# Patient Record
Sex: FEMALE | Race: WHITE
[De-identification: names, ages, dates, MRNs, and addresses within clinical notes are randomized per-mention and may not be internally consistent; named-entity substitution may affect disease eponyms.]

---

## 2017-01-26 ENCOUNTER — HOSPITAL ENCOUNTER (OUTPATIENT)
Dept: HOSPITAL 80 - FCP | Age: 77
End: 2017-01-26
Attending: NURSE PRACTITIONER
Payer: COMMERCIAL

## 2017-01-26 DIAGNOSIS — Z51.81: Primary | ICD-10-CM

## 2017-01-26 NOTE — CPEKG
Heart Rate: 68

RR Interval: 882

P-R Interval: 148

QRSD Interval: 78

QT Interval: 384

QTC Interval: 409

P Axis: 38

QRS Axis: 19

T Wave Axis: -9

EKG Severity - NORMAL ECG -

EKG Impression: SINUS RHYTHM

Electronically Signed By: Aubrey Echeverria 26-Jan-2017 19:24:38

## 2017-03-15 ENCOUNTER — HOSPITAL ENCOUNTER (OUTPATIENT)
Dept: HOSPITAL 80 - FIMAGING | Age: 77
End: 2017-03-15
Attending: INTERNAL MEDICINE
Payer: COMMERCIAL

## 2017-03-15 DIAGNOSIS — M24.112: ICD-10-CM

## 2017-03-15 DIAGNOSIS — M19.012: Primary | ICD-10-CM

## 2018-01-31 NOTE — GHP
[f 
rep st]



                                                       PREOP HISTORY AND 
PHYSICAL





ADMISSION DIAGNOSIS:  Vaginal vault prolapse by history.



HISTORY OF PRESENT ILLNESS:  This is a 77-year-old lady who has had vaginal 
prolapse and has dealt with it for years.  At the present time, she is to the 
point she does not want to deal with it anymore.  She has decided to undergo a 
sacral spinous suspension, possible colpopexy.  We discussed that if we do the 
sacral spinous suspension, it will be done with a porcine graft.  At the same 
time, may consider doing a transobturator tape, but the sacral colpopexy is the 
other option of therapy.



PAST MEDICAL HISTORY:  She has had cystocele, diabetes, hypertension, vaginal 
vault prolapse.



PAST SURGICAL HISTORY:  Spinal surgery, leg surgery, gallbladder.



MEDICATIONS:  Include gabapentin, __________, metformin, methadone, omeprazole, 
triamterene.



ALLERGIES:  None.



SOCIAL HISTORY:  Mild alcohol consumption.  Former smoker.  Immunizations up to 
date.



REVIEW OF SYSTEMS:  Negative cardiac, respiratory, GI, and endocrine.



PHYSICAL EXAMINATION:  VITAL SIGNS:  Stable.  CHEST:  Clear.  HEART:  Regular 
rate and rhythm.  ABDOMEN:  Normal.  No organomegaly, rebound, or guarding.



ASSESSMENT/PLAN:  She has significant cystocele.  At the present time, she is 
admitted for the above procedure.  Indications, complications, and risks 
associated with it discussed.  She appears to be well informed.  Written and 
verbal consent have been obtained.





Job #:  [id number]

MTDD

## 2018-02-01 ENCOUNTER — HOSPITAL ENCOUNTER (OUTPATIENT)
Dept: HOSPITAL 80 - FSGY | Age: 78
Discharge: HOME | End: 2018-02-01
Attending: SPECIALIST
Payer: COMMERCIAL

## 2018-02-01 VITALS
HEART RATE: 86 BPM | TEMPERATURE: 99 F | SYSTOLIC BLOOD PRESSURE: 149 MMHG | DIASTOLIC BLOOD PRESSURE: 82 MMHG | OXYGEN SATURATION: 94 % | RESPIRATION RATE: 16 BRPM

## 2018-02-01 DIAGNOSIS — N81.4: Primary | ICD-10-CM

## 2018-02-01 DIAGNOSIS — R35.0: ICD-10-CM

## 2018-02-01 DIAGNOSIS — N81.10: ICD-10-CM

## 2018-02-01 DIAGNOSIS — R35.1: ICD-10-CM

## 2018-02-01 NOTE — PDANEPAE
ANE History of Present Illness





78 yo female for bladder suspension.





Given pt h/o CAD and carotid stenosis and new bruit with systolic heart murmur, 

we will cancel elective surgery for today. Pt needs to see a cardiologist for 

assessment and stratification of risk, as well as carotid imaging and possibly 

ECHO.





ANE Past Medical History





- Cardiovascular History


Hx Hypertension: Yes


Hx Arrhythmias: No


Hx Chest Pain: No


Hx Coronary Artery / Peripheral Vascular Disease: Yes


Hx CHF / Valvular Disease: No


Hx Palpitations: No


Cardiovascular History Comment: CAD.  CAROTID ATHEROSCLEROSIS-L carotid 

endartorectomy 9/8/15.  HYPERCHOLESTEROLEMIA





- Pulmonary History


Hx COPD: No


Hx Asthma/Reactive Airway Disease: No


Hx Recent Upper Respiratory Infection: No


Hx Oxygen in Use at Home: Yes


Hx Sleep Apnea: Yes


Sleep Apnea Screening Result - Last Documented: Positive


Pulmonary History Comment: SLEEP APNEA - CPAP & NOC O2- instructed pt to bring





- Neurologic History


Hx Cerebrovascular Accident: No


Hx Seizures: No


Hx Dementia: No


Neurologic History Comment: CHRONIC PAIN SYNDROME. Numbness tingling both feet. 

R > L





- Endocrine History


Hx Diabetes: Yes


Hypothyroid: No


Obesity: no


Endocrine History Comment: TYPE 2





- Renal History


Hx Renal Disorders: Yes


Renal History Comment: PROLAPSED BLADDER





- Liver History


Hx Hepatic Disorders: No





- Neurological & Psychiatric Hx


Hx Neurological and Psychiatric Disorders: Yes


Neurological / Psychiatric History Comment: DEPRESSION





- Cancer History


Hx Cancer: No





- Congenital Disorder History


Hx Congenital Disorders: No





- GI History


GERD: moderate


Hx Gastrointestinal Disorders: Yes


Gastrointestinal History Comment: GERD med.  Chronic constipation.





- Other Health History


Other Health History: ARTHRITIS.  OSTEOPOROSIS.  wears glasses





- Chronic Pain History


Chronic Pain: Yes (CHRONIC PAIN SYNDROME)





- Surgical History


Prior Surgeries: 07/05/16 right ankle hardware removed with [facility].  9/2015 R 

ANKLE ORIF.  DELILAH 2003.  HYSTERECTOMY.  KNEE SURGERY 2005.  TARSAL TUNNEL 

RELEASE 2008/2011. L carotid endartorectomy 9/8/15. 3 different surg R knee. 

ORIF L ankle. Total hysterectomy. C spine surg. L eye cataract surg.  SHOULDER 

SURGERY 2002.  SPINAL CORD STIMULATOR IMPLANT.  SPINAL SURGERY 2006/2007.  

LUMBAR SYMPATHECTOMY 2012





ANE Review of Systems


Review of Systems: 








- Exercise capacity


METS (RN): 4 METS





- Systems


Constitutional: Reports: no symptoms


Cardiac: Reports: no symptoms


Respiratory: Reports: no symptoms


Muscolosketal: Reports: other (CRPS/B leg paresthesia)


Neurological: Reports: weakness, other (dizzy spells recently, has not 

discussed with a physician or seen any healthcare provider about these)





ANE Patient History





- Allergies


Allergies/Adverse Reactions: 








No Known Allergies Allergy (Verified 02/01/18 08:17)


 








- Home Medications


Home Medications: 








Ascorbic Acid [Vitamin C 500 mg (*)] 500 mg PO DAILY 04/20/12 [Last Taken 06/28/ 16]


Lisinopril [Zestril 20 mg (*)] 20 mg PO HS 04/20/12 [Last Taken 07/04/16]


Lovastatin 40 mg PO HS 04/20/12 [Last Taken 07/04/16]


Metformin HCl [Metformin 1000 mg] 1,000 mg PO BID 04/20/12 [Last Taken 07/04/16 

22:00]


Omeprazole [Prilosec 40 mg] 40 mg PO DAILY 04/20/12 [Last Taken 07/04/16]


Triamterene/Hctz 37.5/25 [Maxzide-25 (*)] 1 tab PO DAILY 04/20/12 [Last Taken 07 /04/16]


Gabapentin 600 mg PO TID 08/28/15 [Last Taken 07/04/16 08:45]


Methadone HCl [Methadone HCl 10 mg (*)] 10 mg PO TID 08/28/15 [Last Taken 07/05/ 16 08:45]


Aspirin EC [Aspirin EC 81 mg (*)] 81 mg PO DAILY 01/31/18 [Last Taken Unknown]


Calcium Carbonate [Oyster Shell Calcium 500 mg (*)] 500 mg PO DAILY 01/31/18 [

Last Taken Unknown]


Cholecalciferol Vit D3 [Vitamin D3 (*)] 1,000 units PO DAILY 01/31/18 [Last 

Taken Unknown]


Ferrous Sulfate [Ferrous Sulf 325 MG (*)] 325 mg PO DAILY 01/31/18 [Last Taken 

Unknown]


Magnesium Oxide [Magnesium Oxide 400 mg (*)] 400 mg PO DAILY 01/31/18 [Last 

Taken Unknown]


Sodium Fluoride [Prevident 5000] 1 cecelia DT HS 01/31/18 [Last Taken Unknown]








- NPO status


NPO Since - Liquids (Date): 01/31/18


NPO Since - Liquids (Time): 22:30


NPO Since - Solids (Date): 01/31/18


NPO Since - Solids (Time): 22:00





- Anes Hx


Anes Hx: no prior problems





- Smoking Hx


Smoking Status: Former smoker (quit 15 yrs ago)


Marijuana use: No





- Alcohol Use


Alcohol Use: Rarely (1/month)





- Family Anes Hx


Family Anes Hx: neg - N/A


Family Hx Anesthesia Complications: NONE





ANE Labs/Vital Signs





- Labs


Result Diagrams: 


 02/01/18 08:10





- Vital Signs


Blood Pressure: 149/82


Heart Rate: 86


Respiratory Rate: 16


O2 Sat (%): 94


Height: 156.21 cm


Weight: 61.235 kg





ANE Physical Exam





- Cardiovascular


Cardiovascular: systolic murmur (harsh III/VI), carotid bruit (R carotid bruit)





- ASA Status


ASA Status: IV

## 2018-02-22 ENCOUNTER — HOSPITAL ENCOUNTER (OUTPATIENT)
Dept: HOSPITAL 80 - BHFA | Age: 78
End: 2018-02-22
Attending: INTERNAL MEDICINE
Payer: COMMERCIAL

## 2018-02-22 DIAGNOSIS — Z01.810: Primary | ICD-10-CM

## 2018-02-22 DIAGNOSIS — I25.10: ICD-10-CM

## 2018-02-22 PROCEDURE — 78452 HT MUSCLE IMAGE SPECT MULT: CPT

## 2018-02-22 PROCEDURE — 93017 CV STRESS TEST TRACING ONLY: CPT

## 2018-03-13 ENCOUNTER — HOSPITAL ENCOUNTER (OUTPATIENT)
Dept: HOSPITAL 80 - BHLMT | Age: 78
End: 2018-03-13
Payer: COMMERCIAL

## 2018-03-13 DIAGNOSIS — I25.10: Primary | ICD-10-CM

## 2018-03-13 DIAGNOSIS — R60.9: ICD-10-CM

## 2018-03-13 DIAGNOSIS — I77.9: ICD-10-CM

## 2018-03-21 ENCOUNTER — HOSPITAL ENCOUNTER (OUTPATIENT)
Dept: HOSPITAL 80 - FCATH | Age: 78
Discharge: HOME | End: 2018-03-21
Attending: INTERNAL MEDICINE
Payer: COMMERCIAL

## 2018-03-21 DIAGNOSIS — E03.9: ICD-10-CM

## 2018-03-21 DIAGNOSIS — I73.9: ICD-10-CM

## 2018-03-21 DIAGNOSIS — K21.9: ICD-10-CM

## 2018-03-21 DIAGNOSIS — R53.83: ICD-10-CM

## 2018-03-21 DIAGNOSIS — G89.4: ICD-10-CM

## 2018-03-21 DIAGNOSIS — E78.5: ICD-10-CM

## 2018-03-21 DIAGNOSIS — I25.10: Primary | ICD-10-CM

## 2018-03-21 DIAGNOSIS — R60.9: ICD-10-CM

## 2018-03-21 DIAGNOSIS — G90.529: ICD-10-CM

## 2018-03-21 DIAGNOSIS — R94.39: ICD-10-CM

## 2018-03-21 DIAGNOSIS — E11.42: ICD-10-CM

## 2018-03-21 DIAGNOSIS — F32.9: ICD-10-CM

## 2018-03-21 DIAGNOSIS — I10: ICD-10-CM

## 2018-03-21 DIAGNOSIS — Z79.82: ICD-10-CM

## 2018-03-21 DIAGNOSIS — G47.33: ICD-10-CM

## 2018-03-21 DIAGNOSIS — Z86.73: ICD-10-CM

## 2018-03-21 LAB
INR PPP: 1.03 (ref 0.83–1.16)
PLATELET # BLD: 260 10^3/UL (ref 150–400)
PROTHROMBIN TIME: 13.7 SEC (ref 12–15)

## 2018-03-21 PROCEDURE — 93458 L HRT ARTERY/VENTRICLE ANGIO: CPT

## 2018-03-21 PROCEDURE — 93005 ELECTROCARDIOGRAM TRACING: CPT

## 2018-03-21 PROCEDURE — C1769 GUIDE WIRE: HCPCS

## 2018-03-21 NOTE — PDDXCAT
Diagnostic Cath Note





- .


Date: 03/21/18


: Asif


Indication: CCC Class III and IV angina on medical treatment


High-risk criteria on non-invasive testing: stress-induced large perfusion 

defect (particularly if anterior)





- Procedure


Access: right groin


Procedure: left heart catheterization, coronary angiography, left ventriculogram





- Materials


Left Heart Cath size: 5F


Left Heart Cath materials: JL3.5, pigtail, Terry's R





- Findings-Left Heart Catheterization


LM: Unobstructed


LAD: Heavily calcified with 99% proximal stenosis over a long segment.


LCX: Luminal irregularities 10 20%.


RCA: Dominant vessel:  70% stenosis in the mid distal right coronary artery. At 

the bifurcation there is an 80% stenosis involving both the posterolateral and 

posterior descending coronary artery.


EDP: 12 mm of mercury


LVEF: 70%


Wall motion: Normal


Complications: Unable to access from right radial artery due to tortuosity


Estimated blood loss: <50ml


Closure method: other (Manual pressure for the groin, TR band for the wrist)


Assessment: Diabetic with proximal LAD disease, distal right coronary disease 

associated with normal LV function.


Plan: 





Aggressive secondary prevention.


Referral for coronary bypass grafting.


If patient is deemed to be inoperable would proceed with PCI of the right 

coronary artery and LAD.  Patient would likely require atherectomy.  High risk 

procedure due to tortuosity, calcification.


Patient Problems: 


 Problems











Problem Status Onset


 


Carotid stenosis Acute  


 


Calcaneal fracture Acute

## 2018-03-21 NOTE — PDPROPOC
Sedation Plan of Care


Sedation Plan of Care: vital signs stable, mental status noted, patient 

educated of risks, benefits, alternatives, patient can tolerate sedation


ASA Classification: ASA 1


Planned drugs: fentanyl, midazolam


Mallampati Score: Class 1


Mallampati Reference Image: 





Patient passed 3-3-2 rule?: Yes

## 2018-03-21 NOTE — CPEKG
Heart Rate: 74

RR Interval: 811

P-R Interval: 152

QRSD Interval: 80

QT Interval: 408

QTC Interval: 453

P Axis: 59

QRS Axis: 4

T Wave Axis: -3

EKG Severity - ABNORMAL ECG -

EKG Impression: SINUS ARRHYTHMIA, RATE 63-87

EKG Impression: LVH WITH SECONDARY REPOLARIZATION ABNORMALITY

Preliminary Awaiting MD Review

## 2018-04-02 ENCOUNTER — HOSPITAL ENCOUNTER (INPATIENT)
Dept: HOSPITAL 80 - F2W | Age: 78
LOS: 6 days | Discharge: NURSING, REHAB OR LONG-TERM CARE | DRG: 236 | End: 2018-04-08
Attending: THORACIC SURGERY (CARDIOTHORACIC VASCULAR SURGERY) | Admitting: THORACIC SURGERY (CARDIOTHORACIC VASCULAR SURGERY)
Payer: COMMERCIAL

## 2018-04-02 DIAGNOSIS — E11.65: ICD-10-CM

## 2018-04-02 DIAGNOSIS — N81.10: ICD-10-CM

## 2018-04-02 DIAGNOSIS — I35.0: ICD-10-CM

## 2018-04-02 DIAGNOSIS — I73.9: ICD-10-CM

## 2018-04-02 DIAGNOSIS — M81.0: ICD-10-CM

## 2018-04-02 DIAGNOSIS — I48.91: ICD-10-CM

## 2018-04-02 DIAGNOSIS — D62: ICD-10-CM

## 2018-04-02 DIAGNOSIS — Z86.73: ICD-10-CM

## 2018-04-02 DIAGNOSIS — D50.0: ICD-10-CM

## 2018-04-02 DIAGNOSIS — I25.110: Primary | ICD-10-CM

## 2018-04-02 DIAGNOSIS — G89.29: ICD-10-CM

## 2018-04-02 DIAGNOSIS — Z96.89: ICD-10-CM

## 2018-04-02 DIAGNOSIS — F11.20: ICD-10-CM

## 2018-04-02 DIAGNOSIS — Z86.010: ICD-10-CM

## 2018-04-02 DIAGNOSIS — E11.51: ICD-10-CM

## 2018-04-02 DIAGNOSIS — E66.9: ICD-10-CM

## 2018-04-02 DIAGNOSIS — E78.5: ICD-10-CM

## 2018-04-02 DIAGNOSIS — G47.33: ICD-10-CM

## 2018-04-02 DIAGNOSIS — I10: ICD-10-CM

## 2018-04-02 DIAGNOSIS — Z87.891: ICD-10-CM

## 2018-04-02 DIAGNOSIS — K21.9: ICD-10-CM

## 2018-04-02 LAB
PLATELET # BLD: 196 10^3/UL (ref 150–400)
PLATELET # BLD: 322 10^3/UL (ref 150–400)

## 2018-04-02 PROCEDURE — P9041 ALBUMIN (HUMAN),5%, 50ML: HCPCS

## 2018-04-02 PROCEDURE — 02100Z9 BYPASS CORONARY ARTERY, ONE ARTERY FROM LEFT INTERNAL MAMMARY, OPEN APPROACH: ICD-10-PCS | Performed by: THORACIC SURGERY (CARDIOTHORACIC VASCULAR SURGERY)

## 2018-04-02 PROCEDURE — 06BQ4ZZ EXCISION OF LEFT SAPHENOUS VEIN, PERCUTANEOUS ENDOSCOPIC APPROACH: ICD-10-PCS | Performed by: THORACIC SURGERY (CARDIOTHORACIC VASCULAR SURGERY)

## 2018-04-02 PROCEDURE — 021109W BYPASS CORONARY ARTERY, TWO ARTERIES FROM AORTA WITH AUTOLOGOUS VENOUS TISSUE, OPEN APPROACH: ICD-10-PCS | Performed by: THORACIC SURGERY (CARDIOTHORACIC VASCULAR SURGERY)

## 2018-04-02 PROCEDURE — 30233N1 TRANSFUSION OF NONAUTOLOGOUS RED BLOOD CELLS INTO PERIPHERAL VEIN, PERCUTANEOUS APPROACH: ICD-10-PCS | Performed by: THORACIC SURGERY (CARDIOTHORACIC VASCULAR SURGERY)

## 2018-04-02 PROCEDURE — 5A1221Z PERFORMANCE OF CARDIAC OUTPUT, CONTINUOUS: ICD-10-PCS | Performed by: THORACIC SURGERY (CARDIOTHORACIC VASCULAR SURGERY)

## 2018-04-02 PROCEDURE — P9016 RBC LEUKOCYTES REDUCED: HCPCS

## 2018-04-02 PROCEDURE — 02L70ZK OCCLUSION OF LEFT ATRIAL APPENDAGE, OPEN APPROACH: ICD-10-PCS | Performed by: THORACIC SURGERY (CARDIOTHORACIC VASCULAR SURGERY)

## 2018-04-02 RX ADMIN — SODIUM CHLORIDE SCH MLS: 900 INJECTION, SOLUTION INTRAVENOUS at 12:01

## 2018-04-02 RX ADMIN — Medication PRN MCG: at 22:10

## 2018-04-02 RX ADMIN — Medication SCH MLS: at 21:50

## 2018-04-02 RX ADMIN — NICARDIPINE HYDROCHLORIDE PRN MLS: 0.1 INJECTION, SOLUTION INTRAVENOUS at 14:52

## 2018-04-02 RX ADMIN — GABAPENTIN SCH: 300 CAPSULE ORAL at 15:34

## 2018-04-02 RX ADMIN — SODIUM CHLORIDE SCH MLS: 900 INJECTION, SOLUTION INTRAVENOUS at 12:00

## 2018-04-02 RX ADMIN — ALBUMIN (HUMAN) PRN MLS: 2.5 SOLUTION INTRAVENOUS at 12:02

## 2018-04-02 RX ADMIN — CHLORHEXIDINE GLUCONATE SCH: 1.2 RINSE ORAL at 21:36

## 2018-04-02 RX ADMIN — ALBUMIN (HUMAN) PRN MLS: 2.5 SOLUTION INTRAVENOUS at 12:26

## 2018-04-02 RX ADMIN — POTASSIUM CHLORIDE PRN MLS: 400 INJECTION, SOLUTION INTRAVENOUS at 19:06

## 2018-04-02 RX ADMIN — HEPARIN SODIUM SCH UNIT: 5000 INJECTION, SOLUTION INTRAVENOUS; SUBCUTANEOUS at 22:47

## 2018-04-02 RX ADMIN — HYDROMORPHONE HCL-SODIUM CHLORIDE 0.9% INJ 6 MG/30ML PRN MG: 0.2 SOLUTION at 18:35

## 2018-04-02 RX ADMIN — GABAPENTIN SCH: 300 CAPSULE ORAL at 21:36

## 2018-04-02 RX ADMIN — POTASSIUM CHLORIDE PRN MLS: 400 INJECTION, SOLUTION INTRAVENOUS at 18:00

## 2018-04-02 RX ADMIN — POTASSIUM CHLORIDE PRN MLS: 400 INJECTION, SOLUTION INTRAVENOUS at 12:00

## 2018-04-02 RX ADMIN — POTASSIUM CHLORIDE PRN MLS: 400 INJECTION, SOLUTION INTRAVENOUS at 13:05

## 2018-04-02 RX ADMIN — MUPIROCIN SCH APP: 20 OINTMENT TOPICAL at 21:34

## 2018-04-02 RX ADMIN — Medication SCH MLS: at 13:57

## 2018-04-02 RX ADMIN — POTASSIUM CHLORIDE PRN MLS: 400 INJECTION, SOLUTION INTRAVENOUS at 14:51

## 2018-04-02 NOTE — GOP
[f rep st]



                                                                OPERATIVE REPORT





DATE OF OPERATION:  04/02/2018



SURGEON:  Darius Grayson DO



ASSISTANT:  Sasha Moore PA-C.



ANESTHESIOLOGIST:  Rayray Nelson MD.



PREOPERATIVE DIAGNOSIS:  Unstable angina pectoris with severe 3-vessel disease.



POSTOPERATIVE DIAGNOSIS:  Unstable angina pectoris with severe 3-vessel disease.



PROCEDURE PERFORMED:  

1.  Coronary bypass grafting x3 with left internal mammary artery to the mid left anterior descending
, saphenous vein graft to the second obtuse marginal, and saphenous vein graft to the posterior desce
nding artery.

2.  Suture ligation of the left atrial appendage.

3.  Endoscopic harvest of the left greater saphenous vein by SANJANA Hill.



FINDINGS:  





DESCRIPTION OF PROCEDURE:  Under general anesthetic, the patient is prepped and draped in sterile cla
ssical manner.  Sternotomy was performed.  She was found to be severely osteoporotic and obese.  A 1.
8 mm mammary was harvested, which had brisk flow, but was small and friable.  She was heparinized and
 cannulated in the ascending aorta and right atrium with pledgeted mattress reinforced sutures becspike
e of the friable and fragile nature.  LV function appeared well preserved.  No echo was placed becaus
e of concerns about dysphagia in this patient.  She had known mild aortic stenosis, which we will add
ress with a TAVR in the distant future.  The mammary was harvested.  She was cannulated with felt aquiles
nforcement of pledgeted mattress sutures.  Bypass was begun.  Cardioplegic arrest was obtained with a
ntegrade cardioplegia, topical hypothermia, and systemic cooling.  The left atrial appendage had a re
latively narrow base.  It was doubly ligated with a silk tie.  We then proceeded with grafting the la
teral circumflex, which was a 1.8 mm vessel with a good quality vein in a reverse saphenous position,
 which was then brought off the ascending aorta with continuous running 5-0 Prolene suture.  We then 
proceeded with grafting the PDA in a similar fashion.  It was a 1.82 mm vessel.  Again, the vein was 
good but on the smaller side, measuring 3.4 mm.  The proximal anastomosis was completed without diffi
culty.  Rewarming was begun while the mammary was grafted to the mid LAD, which was a 2.2 mm vessel. 
 It was tacked to the epicardium, explored the diagonal vessel which was less than a millimeter.  The
 patient was then easily weaned from bypass.  The heparin was reversed with protamine.  The cannula w
as removed and oversewn.  Two ventricular pacing wires, 2 pleural, 1 mediastinal drain were placed.  
The thymic fat and pericardium were closed.  Chest was closed in standard fashion.  Patient was retur
flavia to ICU in stable condition.





Job #:  [id number]

## 2018-04-02 NOTE — PDANEPAE
ANE History of Present Illness





here for CABG





ANE Past Medical History





- Cardiovascular History


Hx Hypertension: Yes


Hx Arrhythmias: No


Hx Chest Pain: No


Hx Coronary Artery / Peripheral Vascular Disease: Yes


Hx CHF / Valvular Disease: No


Hx Palpitations: No


Cardiovascular History Comment: CAD.  CAROTID ATHEROSCLEROSIS-L carotid 

endartorectomy 9/8/15.  HYPERCHOLESTEROLEMIA





- Pulmonary History


Hx COPD: No


Hx Asthma/Reactive Airway Disease: No


Hx Recent Upper Respiratory Infection: No


Hx Oxygen in Use at Home: Yes


Hx Sleep Apnea: Yes


Sleep Apnea Screening Result - Last Documented: Positive


Pulmonary History Comment: SLEEP APNEA - CPAP & NOC O2- instructed pt to bring





- Neurologic History


Hx Cerebrovascular Accident: No


Hx Seizures: No


Hx Dementia: No


Neurologic History Comment: CHRONIC PAIN SYNDROME. Nerve damage R ft;.  

Numbness tingling both feet. R > L





- Endocrine History


Hx Diabetes: Yes


Endocrine History Comment: TYPE 2





- Renal History


Hx Renal Disorders: Yes


Renal History Comment: PROLAPSED BLADDER





- Liver History


Hx Hepatic Disorders: No





- Neurological & Psychiatric Hx


Hx Neurological and Psychiatric Disorders: Yes


Neurological / Psychiatric History Comment: DEPRESSION -no Rx





- Cancer History


Hx Cancer: No





- Congenital Disorder History


Hx Congenital Disorders: No





- GI History


Hx Gastrointestinal Disorders: Yes


Gastrointestinal History Comment: GERD med.  Mild constipation





- Other Health History


Other Health History: ARTHRITIS.  OSTEOPOROSIS.  wears glasses





- Chronic Pain History


Chronic Pain: Yes (CHRONIC PAIN SYNDROME)





- Surgical History


Prior Surgeries: 07/05/16 right ankle hardware removed with [facility].  9/2015 R 

ANKLE ORIF.  DELILAH 2003.  KNEE SURGERY 2005.  TARSAL TUNNEL RELEASE 2008/2011. 

L carotid endartorectomy 9/8/15. 3 different surg R knee. ORIF L ankle. Total 

hysterectomy. C spine surg. L eye cataract surg.  SHOULDER SURGERY 2002.  

SPINAL CORD STIMULATOR IMPLANT.  SPINAL SURGERY 2006/2007.  LUMBAR 

SYMPATHECTOMY 2012





ANE Review of Systems


Review of systems is: negative


Review of Systems: 








- Exercise capacity


METS (RN): 3 METS





ANE Patient History





- Allergies


Allergies/Adverse Reactions: 








No Known Allergies Allergy (Verified 03/28/18 16:20)


 








- Home Medications


Home medications: home medication list seen and reviewed


Home Medications: 








Ascorbic Acid [Vitamin C 500 mg (*)] 500 mg PO DAILY 04/20/12 [Last Taken 03/31/ 18]


Lisinopril [Zestril 20 mg (*)] 20 mg PO DAILY 04/20/12 [Last Taken 04/01/18 20:

00]


Lovastatin 40 mg PO HS 04/20/12 [Last Taken 04/01/18 20:00]


Omeprazole [Prilosec 40 mg] 40 mg PO DAILY 04/20/12 [Last Taken 04/01/18 09:00]


Methadone HCl [Methadone HCl 10 mg (*)] 10 mg PO TID 08/28/15 [Last Taken 04/01/ 18 22:00]


Aspirin EC [Aspirin EC 81 mg (*)] 81 mg PO DAILY 01/31/18 [Last Taken 04/01/18 

20:00]


Cholecalciferol Vit D3 [Vitamin D3 (*)] 1,000 units PO DAILY 01/31/18 [Last 

Taken 03/31/18]


Ferrous Sulfate [Ferrous Sulf 325 MG (*)] 325 mg PO DAILY 01/31/18 [Last Taken 

03/31/18]


Magnesium Oxide [Magnesium Oxide 400 mg (*)] 400 mg PO DAILY 01/31/18 [Last 

Taken 03/31/18]


Cholecalciferol Vit D3 [Vitamin D3 (*)] 5,000 units PO DAILY 03/19/18 [Last 

Taken 03/31/18]


Furosemide [Lasix 20 MG (*)] 20 mg PO DAILY 03/19/18 [Last Taken 04/01/18 15:00]


Gabapentin [Neurontin 300 MG (*)] 600 mg PO BID 03/19/18 [Last Taken 04/01/18 20

:00]


Metoprolol Tartrate [Lopressor 25 mg (*)] 12.5 mg PO BID 03/19/18 [Last Taken 04 /01/18 20:00]








- NPO status


NPO Status: no food or drink >8 hours





- Smoking Hx


Smoking Status: Former smoker





- Family Anes Hx


Family Hx Anesthesia Complications: NONE





ANE Labs/Vital Signs





- Vital Signs


Vital Signs: reviewed preoperatively; see RN documention for details


Height: 156.21 cm


Weight: 61.235 kg





ANE Physical Exam





- Airway


Neck exam: FROM


Mallampati Score: Class 1





- Pulmonary


Pulmonary: no respiratory distress





- Cardiovascular


Cardiovascular: regular rate and rhythym





- ASA Status


ASA Status: III





ANE Anesthesia Plan


Anesthesia Plan: general endotracheal anesthesia


Lines/Monitors: arterial line, central line

## 2018-04-02 NOTE — PDHPUP
History & Physical Update


H&P update statement: 


This history and physical update is based on an assessment of the patient which 

was completed after admission or registration (within 24 hours), but prior to 

the surgery/procedure.





H&P update: H&P reviewed & patient examined, changes noted (Cont to have occ 

chest twinges, not intense enough or long enough to treat. Started on a 

diuretic for RLE (prior ankle surgery) edema with good effect. Repeat H/H 

pending.)

## 2018-04-02 NOTE — CPEKG
Heart Rate: 60

RR Interval: 1000

P-R Interval: 172

QRSD Interval: 90

QT Interval: 512

QTC Interval: 512

P Axis: 71

QRS Axis: 23

T Wave Axis: -23

EKG Severity - BORDERLINE ECG -

EKG Impression: SINUS RHYTHM

EKG Impression: BORDERLINE T ABNORMALITIES, INFERIOR LEADS

EKG Impression: PROLONGED QT INTERVAL

Electronically Signed By: Kenji Blanc 02-Apr-2018 13:22:13

## 2018-04-03 LAB — PLATELET # BLD: 194 10^3/UL (ref 150–400)

## 2018-04-03 RX ADMIN — GABAPENTIN SCH MG: 300 CAPSULE ORAL at 21:02

## 2018-04-03 RX ADMIN — ASPIRIN 81 MG SCH MG: 81 TABLET ORAL at 10:50

## 2018-04-03 RX ADMIN — METOPROLOL TARTRATE SCH: 1 INJECTION, SOLUTION INTRAVENOUS at 14:37

## 2018-04-03 RX ADMIN — METOPROLOL TARTRATE SCH MG: 25 TABLET, FILM COATED ORAL at 21:02

## 2018-04-03 RX ADMIN — GABAPENTIN SCH MG: 300 CAPSULE ORAL at 17:56

## 2018-04-03 RX ADMIN — METOPROLOL TARTRATE SCH MG: 25 TABLET, FILM COATED ORAL at 11:49

## 2018-04-03 RX ADMIN — Medication PRN MCG: at 00:01

## 2018-04-03 RX ADMIN — MUPIROCIN SCH APP: 20 OINTMENT TOPICAL at 21:03

## 2018-04-03 RX ADMIN — MUPIROCIN SCH APP: 20 OINTMENT TOPICAL at 11:59

## 2018-04-03 RX ADMIN — Medication SCH MLS: at 21:02

## 2018-04-03 RX ADMIN — NICARDIPINE HYDROCHLORIDE PRN MLS: 0.1 INJECTION, SOLUTION INTRAVENOUS at 03:31

## 2018-04-03 RX ADMIN — Medication PRN MCG: at 08:37

## 2018-04-03 RX ADMIN — HEPARIN SODIUM SCH UNIT: 5000 INJECTION, SOLUTION INTRAVENOUS; SUBCUTANEOUS at 21:02

## 2018-04-03 RX ADMIN — PANTOPRAZOLE SODIUM SCH MG: 40 TABLET, DELAYED RELEASE ORAL at 10:50

## 2018-04-03 RX ADMIN — INSULIN HUMAN SCH UNITS: 100 INJECTION, SOLUTION PARENTERAL at 17:57

## 2018-04-03 RX ADMIN — GABAPENTIN SCH MG: 300 CAPSULE ORAL at 10:49

## 2018-04-03 RX ADMIN — Medication PRN MCG: at 01:40

## 2018-04-03 RX ADMIN — METHADONE HYDROCHLORIDE SCH MG: 10 TABLET ORAL at 21:02

## 2018-04-03 RX ADMIN — CHLORHEXIDINE GLUCONATE SCH: 1.2 RINSE ORAL at 15:32

## 2018-04-03 RX ADMIN — METHADONE HYDROCHLORIDE SCH MG: 10 TABLET ORAL at 11:49

## 2018-04-03 RX ADMIN — SODIUM CHLORIDE SCH MLS: 900 INJECTION, SOLUTION INTRAVENOUS at 03:31

## 2018-04-03 RX ADMIN — Medication PRN MCG: at 05:21

## 2018-04-03 RX ADMIN — INSULIN HUMAN SCH UNITS: 100 INJECTION, SOLUTION PARENTERAL at 21:34

## 2018-04-03 RX ADMIN — HYDROMORPHONE HCL-SODIUM CHLORIDE 0.9% INJ 6 MG/30ML PRN MG: 0.2 SOLUTION at 04:25

## 2018-04-03 RX ADMIN — Medication SCH MLS: at 14:40

## 2018-04-03 RX ADMIN — METOPROLOL TARTRATE SCH MG: 1 INJECTION, SOLUTION INTRAVENOUS at 13:55

## 2018-04-03 RX ADMIN — HEPARIN SODIUM SCH UNIT: 5000 INJECTION, SOLUTION INTRAVENOUS; SUBCUTANEOUS at 14:40

## 2018-04-03 RX ADMIN — Medication PRN MCG: at 10:30

## 2018-04-03 RX ADMIN — Medication SCH MLS: at 05:21

## 2018-04-03 RX ADMIN — HEPARIN SODIUM SCH UNIT: 5000 INJECTION, SOLUTION INTRAVENOUS; SUBCUTANEOUS at 05:21

## 2018-04-03 RX ADMIN — METHADONE HYDROCHLORIDE SCH MG: 10 TABLET ORAL at 17:56
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
## 2018-04-04 LAB — PLATELET # BLD: 238 10^3/UL (ref 150–400)

## 2018-04-04 RX ADMIN — METOPROLOL TARTRATE SCH MG: 25 TABLET, FILM COATED ORAL at 08:01

## 2018-04-04 RX ADMIN — GABAPENTIN SCH MG: 300 CAPSULE ORAL at 07:59

## 2018-04-04 RX ADMIN — HEPARIN SODIUM SCH UNIT: 5000 INJECTION, SOLUTION INTRAVENOUS; SUBCUTANEOUS at 05:45

## 2018-04-04 RX ADMIN — INSULIN HUMAN SCH UNITS: 100 INJECTION, SOLUTION PARENTERAL at 22:25

## 2018-04-04 RX ADMIN — APIXABAN SCH MG: 2.5 TABLET, FILM COATED ORAL at 22:12

## 2018-04-04 RX ADMIN — INSULIN HUMAN SCH UNITS: 100 INJECTION, SOLUTION PARENTERAL at 18:15

## 2018-04-04 RX ADMIN — GABAPENTIN SCH MG: 300 CAPSULE ORAL at 22:11

## 2018-04-04 RX ADMIN — APIXABAN SCH MG: 2.5 TABLET, FILM COATED ORAL at 09:20

## 2018-04-04 RX ADMIN — ASPIRIN 81 MG SCH MG: 81 TABLET ORAL at 08:03

## 2018-04-04 RX ADMIN — Medication SCH MLS: at 05:45

## 2018-04-04 RX ADMIN — PANTOPRAZOLE SODIUM SCH MG: 40 TABLET, DELAYED RELEASE ORAL at 08:03

## 2018-04-04 RX ADMIN — HYDROCODONE BITARTRATE AND ACETAMINOPHEN PRN TAB: 5; 325 TABLET ORAL at 18:15

## 2018-04-04 RX ADMIN — HYDROCODONE BITARTRATE AND ACETAMINOPHEN PRN TAB: 5; 325 TABLET ORAL at 13:32

## 2018-04-04 RX ADMIN — INSULIN HUMAN SCH UNITS: 100 INJECTION, SOLUTION PARENTERAL at 07:55

## 2018-04-04 RX ADMIN — INSULIN HUMAN SCH UNITS: 100 INJECTION, SOLUTION PARENTERAL at 13:33

## 2018-04-04 RX ADMIN — AMIODARONE HYDROCHLORIDE SCH MG: 200 TABLET ORAL at 22:12

## 2018-04-04 RX ADMIN — METHADONE HYDROCHLORIDE SCH MG: 10 TABLET ORAL at 22:11

## 2018-04-04 RX ADMIN — METHADONE HYDROCHLORIDE SCH MG: 10 TABLET ORAL at 07:57

## 2018-04-04 RX ADMIN — METHADONE HYDROCHLORIDE SCH MG: 10 TABLET ORAL at 17:02

## 2018-04-04 RX ADMIN — HYDROCODONE BITARTRATE AND ACETAMINOPHEN PRN TAB: 5; 325 TABLET ORAL at 06:15

## 2018-04-04 RX ADMIN — GABAPENTIN SCH MG: 300 CAPSULE ORAL at 17:02

## 2018-04-04 RX ADMIN — MUPIROCIN SCH APP: 20 OINTMENT TOPICAL at 08:37

## 2018-04-04 RX ADMIN — DOCUSATE SODIUM AND SENNOSIDES SCH TAB: 50; 8.6 TABLET ORAL at 22:11

## 2018-04-04 RX ADMIN — METOPROLOL TARTRATE SCH MG: 25 TABLET, FILM COATED ORAL at 22:10

## 2018-04-04 RX ADMIN — AMIODARONE HYDROCHLORIDE SCH MG: 200 TABLET ORAL at 07:57

## 2018-04-04 RX ADMIN — AMIODARONE HYDROCHLORIDE SCH MG: 200 TABLET ORAL at 07:59

## 2018-04-04 NOTE — SOAPPROG
SOAP Progress Note


Assessment/Plan: 


Assessment:





Postoperative hypoxemia:  Multifactorial, improving.





Status post coronary artery bypass grafting.  Atrial fibrillation yesterday, 

now back into normal sinus rhythm with amiodarone.  On anticoagulation.  Stable 

hemodynamically.  Doing well.





Diabetes mellitus:  Probably more significant prior to admission then realized.

  On insulin drip initially.  Now on sliding scale coverage which is being 

increased.  Glucoses remain high at times.





Metabolic:  No issues identified.





DVT prophylaxis:  SCDs, ambulating, aspirin.  GI prophylaxis:  Pantoprazole.





Anemia:  Chronic as well as acute.  Mildly down now, hematocrit 32. Baseline 35











Plan:  Continue postoperative care.  Increase ambulation as tolerated.  

Continue IS.  Follow glucoses.  Increase insulin as needed.  Wean oxygen as 

tolerated.





I will sign off at this point.  Please let me know if I can be of further 

assistance.








Subjective: 





Doing well, feels better.  More active.  Up in chair.  Denies significant chest 

pain.  Chronic pain at baseline.


Objective: 





 Vital Signs











Temp Pulse Resp BP Pulse Ox


 


 36.7 C   70   17   124/65 H  93 


 


 04/04/18 16:00  04/04/18 16:00  04/04/18 16:00  04/04/18 16:00  04/04/18 16:00








 Laboratory Results





 04/04/18 04:12 





 04/04/18 04:12 





 











 04/03/18 04/04/18 04/05/18





 05:59 05:59 05:59


 


Intake Total 2361.3 1780.4 1230


 


Output Total 2878 1375 180


 


Balance -516.7 405.4 1050








CXR:  Some hypoventilatory changes and basilar atelectasis, otherwise stable.  

No evidence of pneumonia, significant congestive heart failure





Physical Exam





- Physical Exam


General Appearance: alert, no apparent distress, other (Up in chair)


EENT: PERRL/EOMI, other (Nasal cannula in place at 3 L)


Neck: normal inspection


Respiratory: lungs clear (Anteriorly), decreased breath sounds (At bases), 

rales (Few rales at bases), No rhonchi, No wheezing


Cardiac/Chest: regular rate, rhythm, other (Chest tube remains in place.  

Serosanguineous drainage into NINA)


Abdomen: normal bowel sounds, non-tender, soft


Skin: normal color, warm/dry


Extremities: pedal edema (Trace)


Neuro/Psych: no motor/sensory deficits, No cognition abnormalities





ICD10 Worksheet


Patient Problems: 


 Problems











Problem Status Onset


 


chronic disease mgmt/transitional care Acute  


 


Postoperative atrial fibrillation Acute  


 


Diabetes mellitus Chronic  


 


S/P CABG x 3 Acute  ~04/02/18


 


Chronic pain Chronic  


 


Chronic anemia Chronic  


 


Acute blood loss anemia Acute  


 


CAD (coronary artery disease), native coronary artery Acute
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
## 2018-04-04 NOTE — HOSPPROG
Hospitalist Progress Note


Assessment/Plan: 





#Uncontrolled DM: A1c 10. Highs up to 400 at home. Diet playing large role


-add low-dose glargine today, cont SSI. Uptitrate as needed





#CAD: s/p CABG, POD #2





#Post-op flutter/fib: Amiodarone, BB. CHADs 6. Eliquis





#Chronic pain: home meds, bowel regimen





#Acute hypoxic resp failure: resolved





#Diet: diabetic





#DVT ppx: Eliquis








Will cont to follow. Please call if questions.


Subjective: no SOB


Objective: 


 Vital Signs











Temp Pulse Resp BP Pulse Ox


 


 37.0 C   133 H  25 H  127/74 H  95 


 


 04/04/18 08:00  04/04/18 08:00  04/04/18 08:00  04/04/18 08:00  04/04/18 08:00








 Laboratory Results





 04/04/18 04:12 





 04/04/18 04:12 





 











 04/03/18 04/04/18 04/05/18





 05:59 05:59 05:59


 


Intake Total 2361.3 1780.4 100


 


Output Total 2878 1375 


 


Balance -516.7 405.4 100














- Physical Exam


Constitutional: no apparent distress, other (tired)


Eyes: PERRL


Ears, Nose, Mouth, Throat: moist mucous membranes


Cardiovascular: regular rate and rhythym, other (CABG incision healing well)


Respiratory: no respiratory distress, no rales or rhonchi


Gastrointestinal: normoactive bowel sounds, soft, non-tender abdomen


Genitourinary: no bladder fullness


Skin: warm


Musculoskeletal: full muscle strength


Neurologic: AAOx3, CN II-XII Intact





ICD10 Worksheet


Patient Problems: 


 Problems











Problem Status Onset


 


Postoperative atrial fibrillation Acute  


 


Diabetes mellitus Chronic  


 


S/P CABG x 3 Acute  ~04/02/18


 


Chronic pain Chronic  


 


Chronic anemia Chronic  


 


Acute blood loss anemia Acute  


 


CAD (coronary artery disease), native coronary artery Acute

## 2018-04-05 RX ADMIN — METHADONE HYDROCHLORIDE SCH MG: 10 TABLET ORAL at 21:46

## 2018-04-05 RX ADMIN — METHADONE HYDROCHLORIDE SCH MG: 10 TABLET ORAL at 15:16

## 2018-04-05 RX ADMIN — AMIODARONE HYDROCHLORIDE SCH MG: 200 TABLET ORAL at 20:04

## 2018-04-05 RX ADMIN — METOPROLOL TARTRATE SCH MG: 50 TABLET, FILM COATED ORAL at 20:03

## 2018-04-05 RX ADMIN — INSULIN HUMAN SCH UNITS: 100 INJECTION, SOLUTION PARENTERAL at 18:03

## 2018-04-05 RX ADMIN — FUROSEMIDE SCH MG: 20 TABLET ORAL at 08:43

## 2018-04-05 RX ADMIN — GABAPENTIN SCH MG: 300 CAPSULE ORAL at 21:46

## 2018-04-05 RX ADMIN — APIXABAN SCH MG: 2.5 TABLET, FILM COATED ORAL at 08:43

## 2018-04-05 RX ADMIN — IRON SUPPLEMENT SCH MG: 325 TABLET ORAL at 08:44

## 2018-04-05 RX ADMIN — OXYCODONE HYDROCHLORIDE AND ACETAMINOPHEN SCH MG: 500 TABLET ORAL at 08:44

## 2018-04-05 RX ADMIN — GABAPENTIN SCH MG: 300 CAPSULE ORAL at 15:12

## 2018-04-05 RX ADMIN — HYDROCODONE BITARTRATE AND ACETAMINOPHEN PRN TAB: 5; 325 TABLET ORAL at 15:39

## 2018-04-05 RX ADMIN — INSULIN HUMAN SCH UNITS: 100 INJECTION, SOLUTION PARENTERAL at 12:19

## 2018-04-05 RX ADMIN — VITAMIN D, TAB 1000IU (100/BT) SCH UNITS: 25 TAB at 08:42

## 2018-04-05 RX ADMIN — APIXABAN SCH MG: 2.5 TABLET, FILM COATED ORAL at 20:03

## 2018-04-05 RX ADMIN — ASPIRIN 81 MG SCH MG: 81 TABLET ORAL at 08:44

## 2018-04-05 RX ADMIN — METOPROLOL TARTRATE SCH MG: 50 TABLET, FILM COATED ORAL at 08:42

## 2018-04-05 RX ADMIN — INSULIN HUMAN SCH UNITS: 100 INJECTION, SOLUTION PARENTERAL at 21:46

## 2018-04-05 RX ADMIN — AMIODARONE HYDROCHLORIDE SCH MG: 200 TABLET ORAL at 08:43

## 2018-04-05 RX ADMIN — PANTOPRAZOLE SODIUM SCH MG: 40 TABLET, DELAYED RELEASE ORAL at 08:44

## 2018-04-05 RX ADMIN — GABAPENTIN SCH MG: 300 CAPSULE ORAL at 08:42

## 2018-04-05 RX ADMIN — METHADONE HYDROCHLORIDE SCH MG: 10 TABLET ORAL at 08:44

## 2018-04-05 RX ADMIN — INSULIN HUMAN SCH UNITS: 100 INJECTION, SOLUTION PARENTERAL at 08:45

## 2018-04-05 RX ADMIN — DOCUSATE SODIUM AND SENNOSIDES SCH TAB: 50; 8.6 TABLET ORAL at 08:44
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
## 2018-04-05 NOTE — HOSPPROG
Hospitalist Progress Note


Assessment/Plan: 





#Uncontrolled DM: A1c 10. Highs up to 400 at home. Diet playing large role. 

Will have nutrition consult.


-Uptitrate glargine today, cont SSI





#CAD: s/p CABG, POD #3





#Post-op flutter/fib: Amiodarone, BB. CHADs 6. Eliquis





#Chronic pain: home meds, bowel regimen





#Acute hypoxic resp failure: resolved





#SHLOMO: using CPAP





#Diet: diabetic





#DVT ppx: Eliquis








Will cont to follow. Please call if questions.


Subjective: tired today


Objective: 


 Vital Signs











Temp Pulse Resp BP Pulse Ox


 


 36.6 C   125 H  18   103/77   92 


 


 04/05/18 12:00  04/05/18 12:20  04/05/18 12:00  04/05/18 12:20  04/05/18 12:00








 Laboratory Results





 04/04/18 04:12 





 04/05/18 05:35 





 











 04/04/18 04/05/18 04/06/18





 05:59 05:59 05:59


 


Intake Total 1780.4 1530 100


 


Output Total 1375 760 370


 


Balance 405.4 770 -270














- Time Spent With Patient


Time Spent with Patient: greater than 35 minutes


Time Spent with Patient: Greater than 35 minutes spent on this patients care, 

greater than 50% of time spent counseling, educating, and coordinating care 

regarding the above mentioned plan.





- Physical Exam


Constitutional: no apparent distress


Eyes: PERRL


Ears, Nose, Mouth, Throat: moist mucous membranes


Cardiovascular: regular rate and rhythym, other (surgical incision healing 

well. )


Respiratory: no respiratory distress


Gastrointestinal: normoactive bowel sounds


Genitourinary: No cordero in urethra


Skin: warm


Neurologic: AAOx3, CN II-XII Intact


Psychiatric: interacting appropriately





ICD10 Worksheet


Patient Problems: 


 Problems











Problem Status Onset


 


Acute blood loss anemia Acute  


 


CAD (coronary artery disease), native coronary artery Acute  


 


Postoperative atrial fibrillation Acute  


 


S/P CABG x 3 Acute  ~04/02/18


 


chronic disease mgmt/transitional care Acute  


 


Chronic anemia Chronic  


 


Chronic pain Chronic  


 


Diabetes mellitus Chronic

## 2018-04-06 RX ADMIN — GABAPENTIN SCH MG: 300 CAPSULE ORAL at 08:08

## 2018-04-06 RX ADMIN — METHADONE HYDROCHLORIDE SCH MG: 10 TABLET ORAL at 08:09

## 2018-04-06 RX ADMIN — AMIODARONE HYDROCHLORIDE SCH MG: 200 TABLET ORAL at 08:10

## 2018-04-06 RX ADMIN — INSULIN HUMAN SCH UNITS: 100 INJECTION, SOLUTION PARENTERAL at 22:28

## 2018-04-06 RX ADMIN — ASPIRIN 81 MG SCH MG: 81 TABLET ORAL at 08:10

## 2018-04-06 RX ADMIN — FUROSEMIDE SCH MG: 20 TABLET ORAL at 08:10

## 2018-04-06 RX ADMIN — APIXABAN SCH MG: 2.5 TABLET, FILM COATED ORAL at 22:26

## 2018-04-06 RX ADMIN — GABAPENTIN SCH MG: 300 CAPSULE ORAL at 17:03

## 2018-04-06 RX ADMIN — METOPROLOL TARTRATE SCH MG: 50 TABLET, FILM COATED ORAL at 22:27

## 2018-04-06 RX ADMIN — GABAPENTIN SCH MG: 300 CAPSULE ORAL at 22:27

## 2018-04-06 RX ADMIN — APIXABAN SCH MG: 2.5 TABLET, FILM COATED ORAL at 08:09

## 2018-04-06 RX ADMIN — PANTOPRAZOLE SODIUM SCH MG: 40 TABLET, DELAYED RELEASE ORAL at 08:10

## 2018-04-06 RX ADMIN — INSULIN HUMAN SCH: 100 INJECTION, SOLUTION PARENTERAL at 17:31

## 2018-04-06 RX ADMIN — INSULIN HUMAN SCH: 100 INJECTION, SOLUTION PARENTERAL at 15:01

## 2018-04-06 RX ADMIN — METHADONE HYDROCHLORIDE SCH MG: 10 TABLET ORAL at 22:27

## 2018-04-06 RX ADMIN — INSULIN HUMAN SCH UNITS: 100 INJECTION, SOLUTION PARENTERAL at 08:05

## 2018-04-06 RX ADMIN — OXYCODONE HYDROCHLORIDE AND ACETAMINOPHEN SCH MG: 500 TABLET ORAL at 08:09

## 2018-04-06 RX ADMIN — INSULIN GLARGINE SCH UNITS: 100 INJECTION, SOLUTION SUBCUTANEOUS at 08:07

## 2018-04-06 RX ADMIN — AMIODARONE HYDROCHLORIDE SCH MG: 200 TABLET ORAL at 22:26

## 2018-04-06 RX ADMIN — METHADONE HYDROCHLORIDE SCH MG: 10 TABLET ORAL at 17:02

## 2018-04-06 RX ADMIN — VITAMIN D, TAB 1000IU (100/BT) SCH UNITS: 25 TAB at 08:10

## 2018-04-06 RX ADMIN — IRON SUPPLEMENT SCH MG: 325 TABLET ORAL at 08:10

## 2018-04-06 RX ADMIN — METOPROLOL TARTRATE SCH MG: 50 TABLET, FILM COATED ORAL at 08:08
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
## 2018-04-06 NOTE — HOSPPROG
Hospitalist Progress Note


Assessment/Plan: 





#Uncontrolled DM: A1c 10.


-better controlled today on glargine. Will cont to increase as needed. Diet 

playing large role. Will have nutrition consult.





#CAD: s/p CABG, POD #4





#Post-op flutter/fib: Amiodarone, BB. CHADs 6. Eliquis





#Chronic pain: home meds, bowel regimen





#Acute hypoxic resp failure: resolved





#Acute blood loss anemia: related to surgery. Transfused 1 unit 4/2





#Leukocytosis: denies infectious sxs. Afebrile. Cont to monitor





#SHLOMO: using CPAP





#Diet: diabetic





#DVT ppx: Eliquis








Will cont to follow. Please call if questions.


Subjective: drain removed today


Objective: 


 Vital Signs











Temp Pulse Resp BP Pulse Ox


 


 36.8 C   73   17   127/82 H  96 


 


 04/06/18 07:19  04/06/18 08:08  04/06/18 07:19  04/06/18 08:08  04/06/18 07:19








 Laboratory Results





 04/04/18 04:12 





 04/06/18 06:20 





 











 04/05/18 04/06/18 04/07/18





 05:59 05:59 05:59


 


Intake Total 1530 2100 


 


Output Total 760 2400 


 


Balance 770 -300 














- Physical Exam


Constitutional: no apparent distress


Eyes: PERRL


Ears, Nose, Mouth, Throat: moist mucous membranes


Cardiovascular: regular rate and rhythym


Respiratory: no respiratory distress


Gastrointestinal: normoactive bowel sounds


Genitourinary: no bladder fullness


Skin: other (surgical incision healing well)


Musculoskeletal: full muscle strength


Neurologic: AAOx3, CN II-XII Intact





ICD10 Worksheet


Patient Problems: 


 Problems











Problem Status Onset


 


Acute blood loss anemia Acute  


 


CAD (coronary artery disease), native coronary artery Acute  


 


Postoperative atrial fibrillation Acute  


 


S/P CABG x 3 Acute  ~04/02/18


 


chronic disease mgmt/transitional care Acute  


 


Chronic anemia Chronic  


 


Chronic pain Chronic  


 


Diabetes mellitus Chronic

## 2018-04-07 RX ADMIN — ASPIRIN 81 MG SCH MG: 81 TABLET ORAL at 10:14

## 2018-04-07 RX ADMIN — PANTOPRAZOLE SODIUM SCH MG: 40 TABLET, DELAYED RELEASE ORAL at 10:14

## 2018-04-07 RX ADMIN — METHADONE HYDROCHLORIDE SCH MG: 10 TABLET ORAL at 10:14

## 2018-04-07 RX ADMIN — FUROSEMIDE SCH MG: 20 TABLET ORAL at 10:14

## 2018-04-07 RX ADMIN — INSULIN HUMAN SCH UNITS: 100 INJECTION, SOLUTION PARENTERAL at 13:00

## 2018-04-07 RX ADMIN — AMIODARONE HYDROCHLORIDE SCH MG: 200 TABLET ORAL at 10:15

## 2018-04-07 RX ADMIN — METOPROLOL TARTRATE SCH MG: 50 TABLET, FILM COATED ORAL at 10:14

## 2018-04-07 RX ADMIN — METHADONE HYDROCHLORIDE SCH MG: 10 TABLET ORAL at 20:24

## 2018-04-07 RX ADMIN — METOPROLOL TARTRATE SCH MG: 50 TABLET, FILM COATED ORAL at 20:24

## 2018-04-07 RX ADMIN — INSULIN HUMAN SCH UNITS: 100 INJECTION, SOLUTION PARENTERAL at 21:51

## 2018-04-07 RX ADMIN — GABAPENTIN SCH MG: 300 CAPSULE ORAL at 10:13

## 2018-04-07 RX ADMIN — OXYCODONE HYDROCHLORIDE AND ACETAMINOPHEN SCH MG: 500 TABLET ORAL at 10:14

## 2018-04-07 RX ADMIN — VITAMIN D, TAB 1000IU (100/BT) SCH UNITS: 25 TAB at 10:13

## 2018-04-07 RX ADMIN — APIXABAN SCH MG: 2.5 TABLET, FILM COATED ORAL at 20:23

## 2018-04-07 RX ADMIN — AMIODARONE HYDROCHLORIDE SCH MG: 200 TABLET ORAL at 20:24

## 2018-04-07 RX ADMIN — METHADONE HYDROCHLORIDE SCH MG: 10 TABLET ORAL at 17:52

## 2018-04-07 RX ADMIN — DIGOXIN SCH MCG: 250 TABLET ORAL at 20:24

## 2018-04-07 RX ADMIN — APIXABAN SCH MG: 2.5 TABLET, FILM COATED ORAL at 10:14

## 2018-04-07 RX ADMIN — IRON SUPPLEMENT SCH MG: 325 TABLET ORAL at 10:15

## 2018-04-07 RX ADMIN — GABAPENTIN SCH MG: 300 CAPSULE ORAL at 17:52

## 2018-04-07 RX ADMIN — GABAPENTIN SCH MG: 300 CAPSULE ORAL at 20:23

## 2018-04-07 RX ADMIN — DIGOXIN SCH MCG: 250 TABLET ORAL at 11:15

## 2018-04-07 RX ADMIN — INSULIN HUMAN SCH UNITS: 100 INJECTION, SOLUTION PARENTERAL at 18:31

## 2018-04-07 RX ADMIN — INSULIN HUMAN SCH: 100 INJECTION, SOLUTION PARENTERAL at 10:15

## 2018-04-07 RX ADMIN — INSULIN GLARGINE SCH UNITS: 100 INJECTION, SOLUTION SUBCUTANEOUS at 10:15
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
## 2018-04-07 NOTE — SOAPPROG
SOAP Progress Note


Assessment/Plan: 


Assessment:


























Plan:





Subjective: 





Afebrile  VSS


Now back in NSR


Minimal pain


Lungs clear


Cor RRR w/o m


Wound clean


SNF soon


Objective: 





 Vital Signs











Temp Pulse Resp BP Pulse Ox


 


 36.4 C   82   24 H  138/83 H  89 L


 


 04/07/18 15:16  04/07/18 15:16  04/07/18 15:16  04/07/18 15:16  04/07/18 15:16








 Laboratory Results





 04/07/18 04:05 





 04/07/18 04:05 





 











 04/06/18 04/07/18 04/08/18





 05:59 05:59 05:59


 


Intake Total 2100 1000 950


 


Output Total 2400 1000 600


 


Balance -300 0 350














ICD10 Worksheet


Patient Problems: 


 Problems











Problem Status Onset


 


Acute blood loss anemia Acute  


 


CAD (coronary artery disease), native coronary artery Acute  


 


Postoperative atrial fibrillation Acute  


 


S/P CABG x 3 Acute  ~04/02/18


 


chronic disease mgmt/transitional care Acute  


 


Chronic anemia Chronic  


 


Chronic pain Chronic  


 


Diabetes mellitus Chronic

## 2018-04-07 NOTE — HOSPPROG
Hospitalist Progress Note


Assessment/Plan: 





#Uncontrolled DM: A1c 10.


-good control on current regimen. Cont glargine and SSI. Met with dietician





#CAD: s/p CABG, POD #4





#Post-op flutter/fib: persistent after amio load and BB. Digoxin added. CHADs 

6. Eliquis





#Chronic pain: home meds, bowel regimen





#Acute hypoxic resp failure: resolved





#Acute blood loss anemia: related to surgery. Transfused 1 unit 4/2





#Leukocytosis: denies infectious sxs. Afebrile. Cont to monitor





#SHLOMO: using CPAP





#Diet: diabetic





#DVT ppx: Eliquis





Will sign off. Please call if questions


Subjective: no cp or sob


Objective: 


 Vital Signs











Temp Pulse Resp BP Pulse Ox


 


 36.4 C   82   24 H  138/83 H  89 L


 


 04/07/18 15:16  04/07/18 15:16  04/07/18 15:16  04/07/18 15:16  04/07/18 15:16








 Laboratory Results





 04/07/18 04:05 





 04/07/18 04:05 





 











 04/06/18 04/07/18 04/08/18





 05:59 05:59 05:59


 


Intake Total 2100 1000 950


 


Output Total 2400 1000 600


 


Balance -300 0 350














- Physical Exam


Constitutional: no apparent distress


Eyes: PERRL


Ears, Nose, Mouth, Throat: moist mucous membranes


Cardiovascular: irregularly irregular, other (surgical incision healing well)


Respiratory: no respiratory distress


Gastrointestinal: normoactive bowel sounds


Genitourinary: no bladder fullness


Skin: warm


Neurologic: AAOx3





ICD10 Worksheet


Patient Problems: 


 Problems











Problem Status Onset


 


Acute blood loss anemia Acute  


 


CAD (coronary artery disease), native coronary artery Acute  


 


Postoperative atrial fibrillation Acute  


 


S/P CABG x 3 Acute  ~04/02/18


 


chronic disease mgmt/transitional care Acute  


 


Chronic anemia Chronic  


 


Chronic pain Chronic  


 


Diabetes mellitus Chronic

## 2018-04-08 VITALS — DIASTOLIC BLOOD PRESSURE: 77 MMHG | SYSTOLIC BLOOD PRESSURE: 136 MMHG

## 2018-04-08 RX ADMIN — OXYCODONE HYDROCHLORIDE AND ACETAMINOPHEN SCH MG: 500 TABLET ORAL at 08:07

## 2018-04-08 RX ADMIN — METOPROLOL TARTRATE SCH MG: 50 TABLET, FILM COATED ORAL at 08:07

## 2018-04-08 RX ADMIN — IRON SUPPLEMENT SCH MG: 325 TABLET ORAL at 08:07

## 2018-04-08 RX ADMIN — INSULIN HUMAN SCH UNITS: 100 INJECTION, SOLUTION PARENTERAL at 08:06

## 2018-04-08 RX ADMIN — VITAMIN D, TAB 1000IU (100/BT) SCH UNITS: 25 TAB at 08:08

## 2018-04-08 RX ADMIN — INSULIN GLARGINE SCH UNITS: 100 INJECTION, SOLUTION SUBCUTANEOUS at 08:09

## 2018-04-08 RX ADMIN — PANTOPRAZOLE SODIUM SCH MG: 40 TABLET, DELAYED RELEASE ORAL at 08:07

## 2018-04-08 RX ADMIN — METHADONE HYDROCHLORIDE SCH MG: 10 TABLET ORAL at 08:08

## 2018-04-08 RX ADMIN — AMIODARONE HYDROCHLORIDE SCH MG: 200 TABLET ORAL at 08:07

## 2018-04-08 RX ADMIN — FUROSEMIDE SCH MG: 20 TABLET ORAL at 08:07

## 2018-04-08 RX ADMIN — GABAPENTIN SCH MG: 300 CAPSULE ORAL at 08:07

## 2018-04-08 RX ADMIN — APIXABAN SCH MG: 2.5 TABLET, FILM COATED ORAL at 08:07

## 2018-04-08 RX ADMIN — DIGOXIN SCH MCG: 250 TABLET ORAL at 08:07

## 2018-04-08 RX ADMIN — ASPIRIN 81 MG SCH MG: 81 TABLET ORAL at 08:07
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
## 2018-04-08 NOTE — PDIAF
- Diagnosis


Diagnosis: CAD s/p CABG; postop PAF; chronic DM2, HTN, pain, anemia, sx 

cystocele


Code Status: Full Code





- Medication Management


Discharge Medications: 


 Medications to Continue on Transfer





Ascorbic Acid [Vitamin C 500 mg (*)] 500 mg PO DAILY 04/20/12 [Last Taken 03/31/ 18]


Omeprazole [Prilosec 40 mg] 40 mg PO DAILY 04/20/12 [Last Taken 04/01/18 09:00]


Methadone HCl [Methadone HCl 10 mg (*)] 10 mg PO TID 08/28/15 [Last Taken 04/01/ 18 22:00]


Aspirin EC [Aspirin EC 81 mg (*)] 81 mg PO DAILY 01/31/18 [Last Taken 04/01/18 

20:00]


Ferrous Sulfate [Ferrous Sulf 325 MG (*)] 325 mg PO DAILY 01/31/18 [Last Taken 

03/31/18]


Cholecalciferol Vit D3 [Vitamin D3 (*)] 5,000 units PO DAILY 03/19/18 [Last 

Taken 03/31/18]


Furosemide [Lasix 20 MG (*)] 20 mg PO DAILY 03/19/18 [Last Taken 04/01/18 15:00]


Gabapentin [Neurontin 300 MG (*)] 600 mg PO TID 03/19/18 [Last Taken 04/01/18 20

:00]


Triamterene/Hydrochlorothiazid [Triamterene-Hctz 37.5-25 mg Tb] 1 each PO DAILY 

04/02/18 [Last Taken Unknown]


Acetaminophen [Tylenol 325mg (*)] 325 - 650 mg PO Q4HRS PRN  tab 04/08/18 [Last 

Taken Unknown]


Amiodarone HCl [Pacerone (*)] 200 mg PO BID  tab 04/08/18 [Last Taken Unknown]


Apixaban [Eliquis] 2.5 mg PO BID  tab 04/08/18 [Last Taken Unknown]


Dextrose 50% Syringe 25 gm IVP PRN PRN  syr 04/08/18 [Last Taken Unknown]


Digoxin [Lanoxin 125 mcg (RX)] 125 mcg PO DAILY AT 10AM  tab 04/08/18 [Last 

Taken Unknown]


Insulin Glargine [Lantus Syringe] 15 units SC DAILY  unit 04/08/18 [Last Taken 

Unknown]


Insulin Regular Human [HumuLIN R] 2 unit SC ACHS  unit 04/08/18 [Last Taken 

Unknown]


Metoprolol Tartrate [Lopressor 50 mg (*)] 50 mg PO BID  tab 04/08/18 [Last 

Taken Unknown]


Naloxone HCl [Narcan] 0.4 mg IVP PRN PRN  inj 04/08/18 [Last Taken Unknown]


Polyethylene Glycol 3350 [Miralax 17 gm (*)] 17 gm PO DAILY PRN  pkt 04/08/18 [

Last Taken Unknown]


Sennosides/Docusate Sodium [Senokot-S] 1 - 2 tab PO BID PRN  tab 04/08/18 [Last 

Taken Unknown]


traMADol [Ultram 50 mg (*)] 25 - 50 mg PO Q6-8PRN PRN  tab 04/08/18 [Last Taken 

Unknown]








Additional Medication Instructions: Reduce amiodarone to 200 mg daily on 4/17/ 18.  Expect recurrent atrial fibrillation. Call for ventricular rate < 60 or > 

130. Do not send to ER if asymptomatic.


Discharge Medications: Refer to the Discharge Home Medication list for PRN 

reason.


PICC Care - Routine: N/A





- Orders


Services needed: Registered Nurse (cardiorespiratory monitoring), Physical 

Therapy (2x daily; sternal precautions x 3 more weeks), Occupational Therapy


Isolation Type: None


Oxygen: prn SpO2 < 90%


Diet Recommendation: ADA 2000 consistent carb


Diet Texture: Regular Texture Diet


Weigh Patient: daily


Wound Care Instructions: daily soap and water.  ok to leave all wounds open to 

air.  avoid ointments or under water immersion until all scabs off


Activity/Weight Bearing Restrictions: Sternal precautions x 3 more weeks.  

Avoid push pull activities.  Avoid lifting > 10 lbs


Additional: Call [facility] ([facility]'s staff) for weight gain > 2 lbs overnoc, 

absolute weight gain > 5 lbs, SBP < 90 or > 150, resting HR < 60 or > 130, 

supplemental O2 needs > 5 lpm, or any wound concerns.  Call PCP for questions 

re insulin or glycemic control.





- Labs/Radiology


CBC w/diff Date: 04/12/18


CMP Date: 04/12/18 (ok to resume lovastatin at 20 mg daily if LFTs WNL)


Other Lab Name, Date and Time: digoxin level 4/16/18


Imaging Orders: CXR prior to surgical appointment


Call or Fax Lab and Imaging Results to: Dr Grayson's nurse Katalina Apple 





- Follow Up Care


Current Providers and Referrals: 


Darius Grayson DO [Doctor of Osteopathy] - 04/17/18 11:30 am


Uli Feild, NP [Certified Nurse Practioner] -  (at [facility] 

within 2 wks release from SNF)


Alexandru Skaggs MD [Primary Care Provider] -
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
## 2018-04-17 ENCOUNTER — HOSPITAL ENCOUNTER (OUTPATIENT)
Dept: HOSPITAL 80 - FLAB | Age: 78
End: 2018-04-17
Attending: THORACIC SURGERY (CARDIOTHORACIC VASCULAR SURGERY)
Payer: COMMERCIAL

## 2018-04-17 DIAGNOSIS — J98.11: ICD-10-CM

## 2018-04-17 DIAGNOSIS — R06.89: Primary | ICD-10-CM

## 2018-04-17 DIAGNOSIS — Z95.1: ICD-10-CM

## 2018-05-17 ENCOUNTER — HOSPITAL ENCOUNTER (EMERGENCY)
Dept: HOSPITAL 80 - FED | Age: 78
Discharge: HOME | End: 2018-05-17
Payer: COMMERCIAL

## 2018-05-17 VITALS — DIASTOLIC BLOOD PRESSURE: 50 MMHG | SYSTOLIC BLOOD PRESSURE: 140 MMHG

## 2018-05-17 DIAGNOSIS — I25.810: ICD-10-CM

## 2018-05-17 DIAGNOSIS — Z87.891: ICD-10-CM
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
## 2018-05-17 DIAGNOSIS — S62.335A: Primary | ICD-10-CM
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
## 2018-05-17 DIAGNOSIS — Z79.4: ICD-10-CM

## 2018-05-17 DIAGNOSIS — Z79.82: ICD-10-CM
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
## 2018-05-17 DIAGNOSIS — E11.9: ICD-10-CM

## 2018-05-17 DIAGNOSIS — S63.253A: ICD-10-CM

## 2018-05-17 DIAGNOSIS — S62.397A: ICD-10-CM

## 2018-05-17 DIAGNOSIS — S01.81XA: ICD-10-CM
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
## 2018-05-17 LAB
INR PPP: 1.16 (ref 0.83–1.16)
PLATELET # BLD: 441 10^3/UL (ref 150–400)
PROTHROMBIN TIME: 15 SEC (ref 12–15)

## 2018-05-17 PROCEDURE — 73130 X-RAY EXAM OF HAND: CPT

## 2018-05-17 PROCEDURE — 99285 EMERGENCY DEPT VISIT HI MDM: CPT

## 2018-05-17 PROCEDURE — 12013 RPR F/E/E/N/L/M 2.6-5.0 CM: CPT

## 2018-05-17 PROCEDURE — 70450 CT HEAD/BRAIN W/O DYE: CPT

## 2018-05-17 PROCEDURE — 0HQ1XZZ REPAIR FACE SKIN, EXTERNAL APPROACH: ICD-10-PCS

## 2018-05-17 PROCEDURE — 93005 ELECTROCARDIOGRAM TRACING: CPT

## 2018-05-17 PROCEDURE — 71260 CT THORAX DX C+: CPT

## 2018-05-17 PROCEDURE — 26700 TREAT KNUCKLE DISLOCATION: CPT

## 2018-05-17 PROCEDURE — 0RSVXZZ REPOSITION LEFT METACARPOPHALANGEAL JOINT, EXTERNAL APPROACH: ICD-10-PCS

## 2018-05-17 NOTE — CPEKG
Heart Rate: 65

RR Interval: 923

P-R Interval: 184

QRSD Interval: 84

QT Interval: 496

QTC Interval: 516

P Axis: 80

QRS Axis: 8

T Wave Axis: 205

EKG Severity - ABNORMAL ECG -

EKG Impression: SINUS RHYTHM

EKG Impression: PROBABLE LVH WITH SECONDARY REPOL ABNRM

EKG Impression: PROLONGED QT INTERVAL

Electronically Signed By: Ethan Gomez 17-May-2018 18:58:14

## 2018-05-17 NOTE — EDPHY
H & P


Stated Complaint: SLIPPED ON RAMP TO HOUSE/SPLIT CHIN/L HAND AND STERNAL PAIN


Source: Patient, Old records


Exam Limitations: No limitations





- Personal History


Current Tetanus/Diphtheria Vaccine: Yes


Tetanus Vaccine Date: < 10 YRS





- Medical/Surgical History


Hx Asthma: No


Hx Chronic Respiratory Disease: No


Hx Diabetes: Yes


Hx Cardiac Disease: Yes


Hx Renal Disease: No


Hx Cirrhosis: No


Hx Alcoholism: No


Hx HIV/AIDS: No


Hx Splenectomy or Spleen Trauma: No


Other PMH: 8 surgeries for R knee fx over last 10 years.  neuropathy/CABG.  

sleep apnea/uses CPAP at night





- Social History


Smoking Status: Former smoker


Time Seen by Provider: 05/17/18 17:38


HPI/ROS: 


HPI:  This is a 77-year-old female who presents with





Chief Complaint: SLIPPED ON RAMP TO HOUSE/SPLIT CHIN/L HAND AND STERNAL PAIN





Location:  Left wrist, anterior chest, chin 


Quality: injury


Duration:  Prior to arrival


Signs and Symptoms:  No bleeding, no radiation, no numbness, no weakness, no 

tingling, no incontinence, + decreased range of motion, + swelling, + pain, no 

fever


Timing:  Acute


Severity:  Moderate


Context:  Patient has a history of coronary artery disease status post CABG 6 

weeks ago on Eliquis and aspirin as well as amiodarone presents with walking up 

of ramp into her house and tripping over her feet.  Patient reports that she 

fell forward and used her left hand across her chest to brace her fall.  

Patient reports that she fell on her chest 1st and then hit her chin onto the 

ramp.  She denies any LOC/neck pain/dizziness/nausea/vomiting/amnesia.  She saw 

her cardiologist earlier this morning at [facility] and had lab strong.

  Today she was taken off of her digoxin and is tapering her amiodarone.  

Reports tetanus is current.  Patient reports that her left 4th and 5th mid hand 

and MCP joints are swollen with decreased range of motion and "black and blue." 

Patient is right-hand dominant.  She complains of anterior chest over her 

sternotomy site being sore and the pain is worsened with palpation and taking a 

deep breath.  She denies any shortness of breath/palpitations.  She has a 

history of SHLOMO and wear CPAP at night with home oxygen.  Patient also complains 

of a chin laceration without any dental loosening/jaw pain. 


Modifying Factors:  None





Comment: 








ROS: see HPI


Constitutional: No fever, no chills, no weight loss


Eyes:  No blurred vision


Respiratory:  No shortness of breath, no cough


Cardiovascular:  No chest pain


Gastrointestinal:  No nausea, no vomiting no diarrhea 


Genitourinary:  No dysuria 


Extremities:  No myalgias 


Neurologic:  No weakness, no numbness


Skin:  No rashes


Hematologic:  No bruising, no bleeding





MEDICAL/SURGICAL/SOCIAL HISTORY: 


Medical/surgical history:  8 surgeries for R knee fx over last 10 years, 

neuropathy/CABG, sleep apnea/uses CPAP at night, cervical neck fusion


Social history:  Former smoker. 








CONSTITUTIONAL:  Extremely polite and cooperative well-appearing elderly white 

female, awake and alert, no obvious distress


HEENT:  2 inch, horizontal, deep laceration to her posterior chin; normocephalic

, PERRL, EOMI. no globe entrapment, no raccoon eyes.  no [place] signs.Tympanic 

membranes clear. No tympanic membrane rupture.  Nares patent; no septal 

hematoma. Oropharynx clear, no exudate and moist pink mucosa. No malocclusion. 

no dental trauma.  Airway patent.  No lymphadenopathy. 


NECK: supple, no midline tenderness, flexion 45 degrees, extension 45 degrees, 

right and left lateral flexion 45 degrees. No meningismus.


Cardiovascular: Normal S1/S2, regular rate, regular rhythm, without murmur rub 

or gallop.


PULMONARY/CHEST:  Symmetrical and nontender. no crepitus. Clear to auscultation 

bilaterally. Good air movement. No accessory muscle usage.


ABDOMEN:  Soft, nondistended, nontender, no ecchymosis, no rebound, no guarding

, no peritoneal signs, no masses or organomegaly. No CVAT.


PELVIC: no pain with rocking; bilateral hips flexion 125 degrees, extension 30 

degrees, with no pain internal rotation and no pain external rotation.


BACK:  No midline tenderness, no paraspinous spasm, deep tendon reflexes 2/2, 

no pain with straight leg raise


EXTREMITIES:  2/2 pulses, left WRIST:  Moderate swelling and ecchymosis over 

her 4th and 5th MCP joints. Extension to 70, flexion to 80, radial deviation 

to 20 degree, ulnar deviation to 30, no scaphoid tenderness, no tenderness 

over ulnar styloid, no tenderness over radial styloid.  MCP joint of the 4th 

and 5th digits show mild decreased flexion and extension. DIP and PIP has good 

range of motion of flexion and extension.  Light touch sensation is intact.  

Arthritic changes noted to all of her fingers on both hands. no deformities, no 

clubbing, no cyanosis or edema.


NEUROLOGICAL: no focal neuro deficits.  GCS 15. 


SKIN: Warm and dry, no erythema. no rash.  Good capillary refill.   


 (Alicja Ballard)


Constitutional: 





 Initial Vital Signs











Temperature (C)  37.3 C   05/17/18 17:32


 


Heart Rate  69   05/17/18 17:32


 


Respiratory Rate  18   05/17/18 17:32


 


Blood Pressure  134/64 H  05/17/18 17:32


 


O2 Sat (%)  84 L  05/17/18 17:32








 











O2 Delivery Mode               Room Air


 


O2 (L/minute)                  2














Allergies/Adverse Reactions: 


 





No Known Allergies Allergy (Verified 05/17/18 17:29)


 








Home Medications: 














 Medication  Instructions  Recorded


 


Ascorbic Acid [Vitamin C 500 mg 500 mg PO DAILY 04/20/12





(*)]  


 


Omeprazole [Prilosec 40 mg] 40 mg PO DAILY 04/20/12


 


Methadone HCl [Methadone HCl 10 mg 10 mg PO TID 08/28/15





(*)]  


 


Aspirin EC [Aspirin EC 81 mg (*)] 81 mg PO DAILY 01/31/18


 


Ferrous Sulfate [Ferrous Sulf 325 325 mg PO DAILY 01/31/18





MG (*)]  


 


Cholecalciferol Vit D3 [Vitamin D3 5,000 units PO DAILY 03/19/18





(*)]  


 


Furosemide [Lasix 20 MG (*)] 20 mg PO DAILY 03/19/18


 


Gabapentin [Neurontin 300 MG (*)] 600 mg PO TID 03/19/18


 


Triamterene/Hydrochlorothiazid 1 each PO DAILY 04/02/18





[Triamterene-Hctz 37.5-25 mg Tb]  


 


Acetaminophen [Tylenol 325mg (*)] 325 - 650 mg PO Q4HRS PRN  tab 04/08/18


 


Amiodarone HCl [Pacerone (*)] 200 mg PO BID  tab 04/08/18


 


Apixaban [Eliquis] 2.5 mg PO BID  tab 04/08/18


 


Dextrose 50% Syringe 25 gm IVP PRN PRN  syr 04/08/18


 


Digoxin [Lanoxin 125 mcg (RX)] 125 mcg PO DAILY AT 10AM  tab 04/08/18


 


Insulin Glargine [Lantus Syringe] 15 units SC DAILY  unit 04/08/18


 


Insulin Regular Human [HumuLIN R] 2 unit SC ACHS  unit 04/08/18


 


Metoprolol Tartrate [Lopressor 50 50 mg PO BID  tab 04/08/18





mg (*)]  


 


Naloxone HCl [Narcan] 0.4 mg IVP PRN PRN  inj 04/08/18


 


Polyethylene Glycol 3350 [Miralax 17 gm PO DAILY PRN  pkt 04/08/18





17 gm (*)]  


 


Sennosides/Docusate Sodium 1 - 2 tab PO BID PRN  tab 04/08/18





[Senokot-S]  


 


traMADol [Ultram 50 mg (*)] 25 - 50 mg PO Q6-8PRN PRN  tab 04/08/18














Medical Decision Making





- Diagnostics


Imaging Results: 





 Imaging Impressions





Chest CT  05/17/18 17:46


Impression:


1. No acute sternal fracture or dehiscence. The sternal wire is intact.


2. Clear lungs. No pulmonary contusion or pneumothorax.


3. No evidence of acute aortic injury.


4. 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


5. Fractured screws at C6-C7 ACDF.


 


Findings discussed with Emergency Department physician assistant, Alicja Ballard on 5/17/2018, 19:15.


 


 


 








Hand X-Ray  05/17/18 17:47


Impression: Acute mildly angulated fractures of the distal left fourth and 

fifth metacarpals.


 


Degenerative changes as above.








Head CT  05/17/18 17:51


Impression:  Negative. No acute fracture or evidence of acute intracranial 

injury.


 


Findings discussed with Emergency Department physician assistant, Alicja Ballard on 5/17/2018, 19:05.


 


 


 


 


 


 











Procedures: 


Procedure:  Laceration repair.





Verbal consent was obtained from the patient.  The 2 inch, deep, horizontal 

chin laceration on the was anesthetized in the usual fashion 6 mL 0.5% 

bupivacaine with epinephrine.  The wound was irrigated, draped and explored to 

its base with a gloved finger.  There were no deep structures involved.  No 

tendon injury was identified.  The wound was repaired with 2 layer closure: #5 

buried horizontal mattress sutures 6 0 Vicryl.  Running subcuticular suture of 

6 0 Vicryl.  Good hemostasis was achieved and patient tolerated procedure well.

  Steri-Strips applied. The procedure was performed by myself.








Procedure:  Dislocation reduction.





The dislocation of the 3rd MCP joint was reduced using countertraction 

technique technique without complications.  Post reduction the patient's 

neurovascular exam is normal.


Post reduction x-ray demonstrates reduction of the joint to the anatomic 

position.


The procedure was performed by myself.








Procedure:  Splint placement.





A left short-arm sugar-tong Ortho Glass splint was applied by the Emergency 

technician.  After application of the splint I returned and re-examined the 

patient.  The splint was adequately immobilizing the joint and distal to the 

splint the patient's circulation and sensation was intact.


 (Alicja Ballard)


ED Course/Re-evaluation: 


Based on [place] CT protocol; patient is older than 65 years old and on 

anticoagulation.  Head CT imaging ordered


No indication for CT maxillofacial scan imaging as doubt mandible fracture. 


Denies any neck trauma and patient politely declined CT cervical spine


Left wrist x-ray, CT of the chest with contrast ordered along with basic labs. 


Fall is mechanical in nature. 


1758:  Spoke with NP Uli Field who requested an update on the patient's 

situation.  Requesting EKG to be performed in the ER. 


1813:  Labs reviewed.  Mild leukocytosis, H&H stable; normocytic anemia noted.  

Creatinine 0.8


1903:  Called by radiologist, Dr. Foy, who advised that head CT scan shows 

no acute intracranial process.


1925:  Called by radiologist who advised that the ACDF shows 3 out of the 4 

screws that are fractured at [id number] to be chronic in nature.  No signs 

of pneumothorax/rib fracture/pneumothorax.  No acute sternal fracture or 

dehiscence. The sternal wire is intact.


Mild to moderate stenosis (50%) origin of the left subclavian artery.





1950:  Patient reports that she had cervical neck fusion and hardware placed 

greater than 10 years ago.  She said approximately 5 years ago she fell and hit 

the back of her neck on the bath tub and knows that she has fractured screws in 

that area.  She denies any pain.  She reports that her neurosurgeon has since 

left this facility so I have provided her with another referral to Neurosurgery.











This patient was seen under the supervision of my secondary supervising 

physician.  I evaluated care for this patient independently.  Discussed this 

patient with Dr. McCollester who did not see the patient.  


 (Alicja Ballard)


Differential Diagnosis: 


Differential diagnosis includes but is not limited to radial fracture, ulnar 

fracture, 4th metacarpal fracture, 5th metacarpal fracture, mandible fracture, 

dental trauma, rib contusion, rib fracture, pneumothorax, pneumomediastinum. 


 (Alicja Ballard)





- Data Points


Laboratory Results: 





 Laboratory Results





 05/17/18 17:50 





 05/17/18 17:50 





 











  05/17/18 05/17/18 05/17/18





  17:56 17:50 17:50


 


WBC      





    


 


RBC      





    


 


Hgb      





    


 


POC Hgb  11.2 gm/dL L gm/dL    





   (12.6-16.3)   


 


Hct      





    


 


POC Hct  33 % L %    





   (38-47)   


 


MCV      





    


 


MCH      





    


 


MCHC      





    


 


RDW      





    


 


Plt Count      





    


 


MPV      





    


 


Neut % (Auto)      





    


 


Lymph % (Auto)      





    


 


Mono % (Auto)      





    


 


Eos % (Auto)      





    


 


Baso % (Auto)      





    


 


Nucleat RBC Rel Count      





    


 


Absolute Neuts (auto)      





    


 


Absolute Lymphs (auto)      





    


 


Absolute Monos (auto)      





    


 


Absolute Eos (auto)      





    


 


Absolute Basos (auto)      





    


 


Absolute Nucleated RBC      





    


 


Immature Gran %      





    


 


Immature Gran #      





    


 


PT    15.0 SEC SEC  





    (12.0-15.0)  


 


INR    1.16   





    (0.83-1.16)  


 


APTT    29.7 SEC SEC  





    (23.0-38.0)  


 


POC Sodium  136 mEq/L mEq/L    





   (135-145)   


 


Sodium      135 mEq/L mEq/L





     (135-145) 


 


POC Potassium  3.4 mEq/L mEq/L    





   (3.3-5.0)   


 


Potassium      3.8 mEq/L mEq/L





     (3.3-5.0) 


 


POC Chloride  90 mEq/L L mEq/L    





   ()   


 


Chloride      90 mEq/L L mEq/L





     () 


 


Carbon Dioxide      32 mEq/l H mEq/l





     (22-31) 


 


Anion Gap      13 mEq/L mEq/L





     (8-16) 


 


POC BUN  13 mg/dL mg/dL    





   (7-23)   


 


BUN      15 mg/dL mg/dL





     (7-23) 


 


Creatinine      0.7 mg/dL mg/dL





     (0.6-1.0) 


 


POC Creatinine  0.8 mg/dL mg/dL    





   (0.6-1.0)   


 


Estimated GFR      > 60 





    


 


Glucose      182 mg/dL H mg/dL





     () 


 


POC Glucose  191 mg/dL H mg/dL    





   ()   


 


Calcium      8.6 mg/dL mg/dL





     (8.5-10.4) 


 


Total Bilirubin      Cancelled 





    


 


Icterus Index      Cancelled 





    


 


AST      Cancelled 





    


 


ALT      Cancelled 





    


 


Alkaline Phosphatase      Cancelled 





    


 


Total Protein      Cancelled 





    


 


Albumin      Cancelled 





    














  05/17/18





  17:50


 


WBC  12.15 10^3/uL H 10^3/uL





   (3.80-9.50) 


 


RBC  3.74 10^6/uL L 10^6/uL





   (4.18-5.33) 


 


Hgb  10.3 g/dL L g/dL





   (12.6-16.3) 


 


POC Hgb  





  


 


Hct  32.9 % L %





   (38.0-47.0) 


 


POC Hct  





  


 


MCV  88.0 fL fL





   (81.5-99.8) 


 


MCH  27.5 pg L pg





   (27.9-34.1) 


 


MCHC  31.3 g/dL L g/dL





   (32.4-36.7) 


 


RDW  14.1 % %





   (11.5-15.2) 


 


Plt Count  441 10^3/uL H 10^3/uL





   (150-400) 


 


MPV  9.1 fL fL





   (8.7-11.7) 


 


Neut % (Auto)  75.3 % H %





   (39.3-74.2) 


 


Lymph % (Auto)  16.0 % %





   (15.0-45.0) 


 


Mono % (Auto)  6.6 % %





   (4.5-13.0) 


 


Eos % (Auto)  1.2 % %





   (0.6-7.6) 


 


Baso % (Auto)  0.3 % %





   (0.3-1.7) 


 


Nucleat RBC Rel Count  0.0 % %





   (0.0-0.2) 


 


Absolute Neuts (auto)  9.15 10^3/uL H 10^3/uL





   (1.70-6.50) 


 


Absolute Lymphs (auto)  1.95 10^3/uL 10^3/uL





   (1.00-3.00) 


 


Absolute Monos (auto)  0.80 10^3/uL 10^3/uL





   (0.30-0.80) 


 


Absolute Eos (auto)  0.14 10^3/uL 10^3/uL





   (0.03-0.40) 


 


Absolute Basos (auto)  0.04 10^3/uL 10^3/uL





   (0.02-0.10) 


 


Absolute Nucleated RBC  0.00 10^3/uL 10^3/uL





   (0-0.01) 


 


Immature Gran %  0.6 % %





   (0.0-1.1) 


 


Immature Gran #  0.07 10^3/uL 10^3/uL





   (0.00-0.10) 


 


PT  





  


 


INR  





  


 


APTT  





  


 


POC Sodium  





  


 


Sodium  





  


 


POC Potassium  





  


 


Potassium  





  


 


POC Chloride  





  


 


Chloride  





  


 


Carbon Dioxide  





  


 


Anion Gap  





  


 


POC BUN  





  


 


BUN  





  


 


Creatinine  





  


 


POC Creatinine  





  


 


Estimated GFR  





  


 


Glucose  





  


 


POC Glucose  





  


 


Calcium  





  


 


Total Bilirubin  





  


 


Icterus Index  





  


 


AST  





  


 


ALT  





  


 


Alkaline Phosphatase  





  


 


Total Protein  





  


 


Albumin  





  











Point of Care Test Results: 





 











  05/17/18





  17:56


 


POC Sodium  136


 


POC Potassium  3.4


 


POC Chloride  90 L


 


POC BUN  13


 


POC Creatinine  0.8


 


POC Glucose  191 H














Departure





- Departure


Disposition: Home, Routine, Self-Care


Clinical Impression: 


Fracture of fourth metacarpal bone of left hand


Qualifiers:


 Encounter type: initial encounter Fracture type: closed Metacarpal location: 

neck Fracture alignment: displaced Qualified Code(s): S62.335A - Displaced 

fracture of neck of fourth metacarpal bone, left hand, initial encounter for 

closed fracture





Fracture of fifth metacarpal bone of left hand


Qualifiers:


 Encounter type: initial encounter Fracture type: closed Fracture morphology: 

other fracture Qualified Code(s): S62.397A - Other fracture of fifth metacarpal 

bone, left hand, initial encounter for closed fracture





Dislocation of left middle finger


Qualifiers:


 Encounter type: initial encounter Qualified Code(s): S63.253A - Unspecified 

dislocation of left middle finger, initial encounter





Accidental fall


Qualifiers:


 Encounter type: initial encounter Qualified Code(s): W19.XXXA - Unspecified 

fall, initial encounter





Laceration of chin without complication


Qualifiers:


 Encounter type: initial encounter Qualified Code(s): S01.81XA - Laceration 

without foreign body of other part of head, initial encounter





Condition: Good


Instructions:  Finger Fracture (ED), Splint Care (ED), Finger Dislocation (ED), 

Facial Laceration (ED)


Additional Instructions: 


Keep the dressing and splint dry and in place until seen by Orthopedics for 

follow-up.


Take Tylenol 650 mg every 4 hours and/or Ibuprofen 600 mg every 8 hours with 

food as needed for pain. 


Use Tramadol every 6 hours as needed for severe/break through pain.


Apply ice for 30 minutes at a time; 2-3 times per day for the next 1-2 days. 


This sutures used to repair your chin laceration were absorbable.  These will 

slowly dissolve over time and do not need to be removed. 


Follow up with Orthopedics-Hand in 7-10 days days at which time they will 

evaluate and recommend with you if conservative management versus further 

imaging is indicated. 





Do not get your chin wet for 48 hr. 


After 48 hr you may wash gently with soap and water and pat dry. 


Allow Steri-Strips to fall off on their own.  This should occur and 

approximately 5-7 days. 





Follow-up with your primary care provider early next week. 


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 

cervical spine has been given.








Return to the ER immediately if you experience new or worsening pain, 

discoloration, numbness, tingling, or any other symptoms that concern you.





Return to the ER immediately if you have progressive headaches, neurologic 

deficits, gait abnormality, visual disturbance, slurred speech, or any other 

symptom that concerns you. 





Referrals: 


Sae Aquino MD [Medical Doctor] - As per Instructions


Alexandru Skaggs MD [Primary Care Provider] - As per Instructions


Bradly Almazan MD [Medical Doctor] - As per Instructions

## 2018-07-10 ENCOUNTER — HOSPITAL ENCOUNTER (OUTPATIENT)
Dept: HOSPITAL 80 - BHLMT | Age: 78
End: 2018-07-10
Attending: INTERNAL MEDICINE
Payer: COMMERCIAL

## 2018-07-10 DIAGNOSIS — R60.9: ICD-10-CM

## 2018-07-10 DIAGNOSIS — I25.10: Primary | ICD-10-CM

## 2018-12-10 ENCOUNTER — HOSPITAL ENCOUNTER (OUTPATIENT)
Dept: HOSPITAL 80 - FIMAGING | Age: 78
End: 2018-12-10
Attending: GENERAL ACUTE CARE HOSPITAL
Payer: COMMERCIAL

## 2018-12-10 DIAGNOSIS — M20.11: ICD-10-CM

## 2018-12-10 DIAGNOSIS — M85.871: Primary | ICD-10-CM

## 2018-12-10 DIAGNOSIS — M43.16: ICD-10-CM

## 2018-12-10 DIAGNOSIS — M47.897: ICD-10-CM

## 2019-01-16 ENCOUNTER — HOSPITAL ENCOUNTER (OUTPATIENT)
Dept: HOSPITAL 80 - FCPNEURO | Age: 79
End: 2019-01-16
Attending: PSYCHIATRY & NEUROLOGY
Payer: COMMERCIAL

## 2019-01-16 DIAGNOSIS — G47.33: Primary | ICD-10-CM

## 2019-01-16 DIAGNOSIS — G47.31: ICD-10-CM

## 2019-04-10 ENCOUNTER — HOSPITAL ENCOUNTER (OUTPATIENT)
Dept: HOSPITAL 80 - FIMAGING | Age: 79
End: 2019-04-10
Payer: COMMERCIAL

## 2019-04-10 DIAGNOSIS — K56.699: Primary | ICD-10-CM

## 2019-04-10 PROCEDURE — 74177 CT ABD & PELVIS W/CONTRAST: CPT

## 2019-05-21 ENCOUNTER — HOSPITAL ENCOUNTER (INPATIENT)
Dept: HOSPITAL 80 - F1N | Age: 79
LOS: 22 days | Discharge: HOME | End: 2019-06-12
Payer: COMMERCIAL

## 2019-06-19 ENCOUNTER — HOSPITAL ENCOUNTER (INPATIENT)
Dept: HOSPITAL 80 - FED | Age: 79
LOS: 5 days | Discharge: NURSING, REHAB OR LONG-TERM CARE | End: 2019-06-24
Payer: COMMERCIAL